# Patient Record
Sex: FEMALE | Race: OTHER | Employment: UNEMPLOYED | URBAN - METROPOLITAN AREA
[De-identification: names, ages, dates, MRNs, and addresses within clinical notes are randomized per-mention and may not be internally consistent; named-entity substitution may affect disease eponyms.]

---

## 2018-08-11 ENCOUNTER — HOSPITAL ENCOUNTER (EMERGENCY)
Age: 23
Discharge: HOME | End: 2018-08-11
Attending: EMERGENCY MEDICINE

## 2018-08-11 VITALS
HEIGHT: 67 IN | OXYGEN SATURATION: 97 % | BODY MASS INDEX: 39.24 KG/M2 | SYSTOLIC BLOOD PRESSURE: 140 MMHG | DIASTOLIC BLOOD PRESSURE: 90 MMHG | TEMPERATURE: 98.9 F | RESPIRATION RATE: 20 BRPM | HEART RATE: 70 BPM | WEIGHT: 250 LBS

## 2018-08-11 DIAGNOSIS — L02.419 AXILLARY ABSCESS: Primary | ICD-10-CM

## 2018-08-11 PROCEDURE — 99283 EMERGENCY DEPT VISIT LOW MDM: CPT

## 2018-08-11 RX ORDER — SULFAMETHOXAZOLE AND TRIMETHOPRIM 800; 160 MG/1; MG/1
1 TABLET ORAL 2 TIMES DAILY
Qty: 14 TABLET | Refills: 0 | Status: SHIPPED | OUTPATIENT
Start: 2018-08-11 | End: 2018-08-18

## 2018-08-11 RX ORDER — DOXYCYCLINE HYCLATE 100 MG
100 TABLET ORAL 2 TIMES DAILY
Qty: 14 TABLET | Refills: 0 | Status: SHIPPED | OUTPATIENT
Start: 2018-08-11 | End: 2018-08-18

## 2018-08-11 ASSESSMENT — PAIN DESCRIPTION - FREQUENCY: FREQUENCY: CONTINUOUS

## 2018-08-11 ASSESSMENT — PAIN DESCRIPTION - PAIN TYPE: TYPE: ACUTE PAIN

## 2018-08-11 ASSESSMENT — PAIN DESCRIPTION - ONSET: ONSET: ON-GOING

## 2018-08-11 ASSESSMENT — PAIN DESCRIPTION - ORIENTATION: ORIENTATION: LEFT

## 2018-08-11 ASSESSMENT — PAIN SCALES - GENERAL: PAINLEVEL_OUTOF10: 2

## 2018-08-11 ASSESSMENT — PAIN DESCRIPTION - LOCATION: LOCATION: ARM

## 2018-08-11 ASSESSMENT — PAIN DESCRIPTION - DESCRIPTORS: DESCRIPTORS: ACHING

## 2018-08-11 ASSESSMENT — PAIN DESCRIPTION - PROGRESSION: CLINICAL_PROGRESSION: GRADUALLY WORSENING

## 2018-08-12 ASSESSMENT — ENCOUNTER SYMPTOMS
ABDOMINAL PAIN: 0
VOMITING: 0
NAUSEA: 0
SHORTNESS OF BREATH: 0

## 2018-08-12 NOTE — ED PROVIDER NOTES
[facility] ED  Emergency Department Encounter  Emergency Medicine Resident     Pt Name: David Norman  MRN: [id number]  [place] 1995  Date of evaluation: 8/12/18  PCP:  No primary care provider on file. CHIEF COMPLAINT       Chief Complaint   Patient presents with    Abscess     left arm pit       HISTORY OF PRESENT ILLNESS  (Location/Symptom, Timing/Onset, Context/Setting, Quality, Duration, Modifying Factors, Severity.)      Carlie Silveira is a 25 y.o. female who presents with Complaints of left axillary lesion that is been ongoing for the past 2 months but is increased in size over the past one day. She says that she typically gets abscesses in the axilla, but they are usually small and resolve spontaneously. She says that she's never had any lesions last for a few days. She says the lesion is open and actively draining. She denies any fevers, nausea, or vomiting. She does admit to some associated pain on palpation. PAST MEDICAL / SURGICAL / SOCIAL / FAMILY HISTORY      has a past medical history of Anxiety; Headache(784.0); and Vision abnormalities. No past surgical history    Social History     Social History    Marital status: Single     Spouse name: N/A    Number of children: N/A    Years of education: N/A     Occupational History    Not on file.      Social History Main Topics    Smoking status: Current Some Day Smoker     Packs/day: 0.50     Types: Cigarettes     Last attempt to quit: 4/30/2015    Smokeless tobacco: Never Used    Alcohol use No    Drug use: No    Sexual activity: Not Currently     Other Topics Concern    Not on file     Social History Narrative    No narrative on file       Family History   Problem Relation Age of Onset    Depression Mother     Mental Illness Mother     High Blood Pressure Father     Heart Attack Father     Diabetes Maternal Grandmother     Mental Illness Maternal Grandmother        Allergies:  Azithromycin; mouth 2 times daily for 7 days     Dispense:  14 tablet     Refill:  0    doxycycline hyclate (VIBRA-TABS) 100 MG tablet     Sig: Take 1 tablet by mouth 2 times daily for 7 days     Dispense:  14 tablet     Refill:  0       DDX: Abscess, cellulitis, hidradenitis    DIAGNOSTIC RESULTS / EMERGENCY DEPARTMENT COURSE / MDM     LABS:  No results found for this visit on 08/11/18. RADIOLOGY:  None     EKG  None     All EKG's are interpreted by the Emergency Department Physician who either signs or Co-signs this chart in the absence of a cardiologist.    EMERGENCY DEPARTMENT COURSE:  Patient presented emergency department for evaluation of lesion to the left axilla. On initial evaluation, patient was afebrile. She was noted to have a 1 cm x 1 cm open lesion to the left axilla consistent with an open abscess. Plan for discharge home with Bactrim. We'll also give patient a prescription for doxycycline and she was concerned about the price. She was told to fill the cheaper of the 2 prescriptions and shred the other. She was given return precautions and all questions were answered. PROCEDURES:  None    Procedures    CONSULTS:  None    CRITICAL CARE:  None     FINAL IMPRESSION      1.  Axillary abscess          DISPOSITION / PLAN     DISPOSITION Decision To Discharge 08/11/2018 11:38:06 PM      PATIENT REFERRED TO:  [facility] ED  [address] [address]  [phone]  Go to   As needed      DISCHARGE MEDICATIONS:  Discharge Medication List as of 8/11/2018 11:39 PM      START taking these medications    Details   sulfamethoxazole-trimethoprim (BACTRIM DS) 800-160 MG per tablet Take 1 tablet by mouth 2 times daily for 7 days, Disp-14 tablet, R-0Print      doxycycline hyclate (VIBRA-TABS) 100 MG tablet Take 1 tablet by mouth 2 times daily for 7 days, Disp-14 tablet, R-0Print             Mana Monterroso MD  Emergency Medicine Resident    (Please note that portions of this note were completed

## 2018-08-12 NOTE — ED PROVIDER NOTES
[address] ED     Emergency Department     Faculty Attestation        I performed a history and physical examination of the patient and discussed management with the resident. I reviewed the residents note and agree with the documented findings and plan of care. Any areas of disagreement are noted on the chart. I was personally present for the key portions of any procedures. I have documented in the chart those procedures where I was not present during the key portions. I have reviewed the emergency nurses triage note. I agree with the chief complaint, past medical history, past surgical history, allergies, medications, social and family history as documented unless otherwise noted below. For Physician Assistant/ Nurse Practitioner cases/documentation I have personally evaluated this patient and have completed at least one if not all key elements of the E/M (history, physical exam, and MDM). Additional findings are as noted. Vital Signs: BP: (!) 140/90  Pulse: 70  Resp: 20  Temp: 98.9 °F (37.2 °C) SpO2: 97 %  PCP:  No primary care provider on file. Pertinent Comments:         Critical Care  None      (Please note that portions of this note were completed with a voice recognition program. Efforts were made to edit the dictations but occasionally words are mis-transcribed.  Whenever words are used in this note in any gender, they shall be construed as though they were used in the gender appropriate to the circumstances; and whenever words are used in this note in the singular or plural form, they shall be construed as though they were used in the form appropriate to the circumstances.)    MD Alverto Garcia  Attending Emergency Medicine Physician            Nik Sigala MD  08/11/18 0055

## 2019-07-04 ENCOUNTER — APPOINTMENT (OUTPATIENT)
Dept: GENERAL RADIOLOGY | Age: 24
End: 2019-07-04
Payer: COMMERCIAL

## 2019-07-04 ENCOUNTER — HOSPITAL ENCOUNTER (EMERGENCY)
Age: 24
Discharge: HOME | End: 2019-07-04
Attending: EMERGENCY MEDICINE
Payer: COMMERCIAL

## 2019-07-04 VITALS
BODY MASS INDEX: 43.95 KG/M2 | HEART RATE: 96 BPM | WEIGHT: 280 LBS | OXYGEN SATURATION: 98 % | DIASTOLIC BLOOD PRESSURE: 93 MMHG | SYSTOLIC BLOOD PRESSURE: 140 MMHG | HEIGHT: 67 IN | TEMPERATURE: 98.4 F | RESPIRATION RATE: 18 BRPM

## 2019-07-04 DIAGNOSIS — S63.501A SPRAIN OF RIGHT WRIST, INITIAL ENCOUNTER: Primary | ICD-10-CM

## 2019-07-04 PROCEDURE — 73110 X-RAY EXAM OF WRIST: CPT

## 2019-07-04 PROCEDURE — 99283 EMERGENCY DEPT VISIT LOW MDM: CPT

## 2019-07-04 RX ORDER — ARIPIPRAZOLE 2 MG/1
TABLET ORAL
COMMUNITY
Start: 2019-03-22

## 2019-07-04 RX ORDER — HYDROXYZINE HYDROCHLORIDE 25 MG/1
TABLET, FILM COATED ORAL
COMMUNITY
Start: 2019-03-22

## 2019-07-04 ASSESSMENT — PAIN DESCRIPTION - ORIENTATION: ORIENTATION: RIGHT

## 2019-07-04 ASSESSMENT — PAIN DESCRIPTION - PAIN TYPE: TYPE: ACUTE PAIN

## 2019-07-04 ASSESSMENT — PAIN SCALES - GENERAL: PAINLEVEL_OUTOF10: 5

## 2019-07-04 ASSESSMENT — PAIN DESCRIPTION - LOCATION: LOCATION: WRIST
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
## 2019-07-15 ENCOUNTER — HOSPITAL ENCOUNTER (OUTPATIENT)
Dept: OCCUPATIONAL THERAPY | Facility: CLINIC | Age: 24
Setting detail: THERAPIES SERIES
Discharge: HOME | End: 2019-07-15
Payer: COMMERCIAL

## 2019-07-15 PROCEDURE — 97165 OT EVAL LOW COMPLEX 30 MIN: CPT

## 2019-07-15 PROCEDURE — 97110 THERAPEUTIC EXERCISES: CPT
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
## 2019-07-18 ENCOUNTER — HOSPITAL ENCOUNTER (OUTPATIENT)
Dept: OCCUPATIONAL THERAPY | Facility: CLINIC | Age: 24
Setting detail: THERAPIES SERIES
Discharge: HOME | End: 2019-07-18
Payer: COMMERCIAL

## 2019-07-18 PROCEDURE — 97035 APP MDLTY 1+ULTRASOUND EA 15: CPT

## 2019-07-18 PROCEDURE — 97110 THERAPEUTIC EXERCISES: CPT

## 2019-07-25 ENCOUNTER — HOSPITAL ENCOUNTER (OUTPATIENT)
Dept: OCCUPATIONAL THERAPY | Facility: CLINIC | Age: 24
Setting detail: THERAPIES SERIES
Discharge: HOME | End: 2019-07-25
Payer: COMMERCIAL

## 2019-07-25 NOTE — DISCHARGE SUMMARY
[] [address] [facility] floor       [address], [place]         Phone: [phone]       Fax: [phone] [x] [address] [facility] at [address] [facility] , [address] [place] [address]  Phone: [phone]  Fax: [phone]       Occupational [address] Extremity  Discharge Note      Date: 2019      Patient: Ann-Marie Damon  : 1995  MRN: [id number]    Physician: Manuel Swenson CNP  Insurance: [employer]     Medical Diagnosis: Right wrist sprain S63.501A. Rehab Codes: Stiffness in wrist M25.63, fine motor skills loss R29.818, pain in right forearm M79.631, pain in right hand M79.641, pain in right finger(s) M79.644, muscle wasting of hand M62.54. Onset Date: 19    Next Dr. Hightower Beam: TBD  Total visits attended:  2  Cancels/No shows: 0/3  Date of initial visit: 07/15/19               Date of final visit: 19    Last available Daily Progress Note, 19:  Subjective:    Pain:  [x] Yes  [] No           Location: Right dorso-radial wrist       Pain Rating: (0-10 scale) 2-3/10  Pain altered Tx:  [x] No  [] Yes  Action: NA  Pt Comments: NA     Objective:  Modalities:   Modality Flow Sheet:  START STOP Tx Modality       Electrical Stim:      19   Ultrasound: 0.8 W/cm2 x 8 mins  Duty factor: __100%  __50%  __33% __20%  Head size: 2 cm  MHz: __1mHz  __3mHz  Location: Dorso-radial right wrist       Hot Pack:       Cold Pack:      Exercises:  Flow Sheet:  Exercise Reps/Time Weight/Level Comments   AROM - right forearm, wrist 10 reps   Pt educ provided: written, verbal, demo. Dexteroll - wrist flex/ext 1series  1/2 pound Added   Pron/supination with cones 2 sets of 10 reps    Added   Wrist maze 2 reps    Added   Ultrasound, see parameters above      Administered     Resistive  with hand exercisor 30 reps 20 pounds  Added          Comments/Assessment: Pain on arrival 2-3/10.  AROM and resistive ex's added to program with

## 2019-07-29 ENCOUNTER — APPOINTMENT (OUTPATIENT)
Dept: OCCUPATIONAL THERAPY | Facility: CLINIC | Age: 24
End: 2019-07-29
Payer: COMMERCIAL

## 2019-07-31 ENCOUNTER — APPOINTMENT (OUTPATIENT)
Dept: OCCUPATIONAL THERAPY | Facility: CLINIC | Age: 24
End: 2019-07-31
Payer: COMMERCIAL

## 2019-12-28 ENCOUNTER — HOSPITAL ENCOUNTER (EMERGENCY)
Age: 24
Discharge: HOME | End: 2019-12-28
Attending: EMERGENCY MEDICINE

## 2019-12-28 VITALS
SYSTOLIC BLOOD PRESSURE: 148 MMHG | OXYGEN SATURATION: 97 % | BODY MASS INDEX: 43.95 KG/M2 | RESPIRATION RATE: 14 BRPM | HEART RATE: 87 BPM | HEIGHT: 67 IN | DIASTOLIC BLOOD PRESSURE: 100 MMHG | TEMPERATURE: 98.1 F | WEIGHT: 280 LBS

## 2019-12-28 DIAGNOSIS — B96.89 BACTERIAL VAGINOSIS: ICD-10-CM

## 2019-12-28 DIAGNOSIS — N76.0 BACTERIAL VAGINOSIS: ICD-10-CM

## 2019-12-28 DIAGNOSIS — N93.8 DYSFUNCTIONAL UTERINE BLEEDING: Primary | ICD-10-CM

## 2019-12-28 LAB
-: ABNORMAL
ABO/RH: NORMAL
ABSOLUTE EOS #: 0.1 K/UL (ref 0–0.4)
ABSOLUTE IMMATURE GRANULOCYTE: ABNORMAL K/UL (ref 0–0.3)
ABSOLUTE LYMPH #: 1.5 K/UL (ref 1–4.8)
ABSOLUTE MONO #: 0.5 K/UL (ref 0.1–1.2)
AMORPHOUS: ABNORMAL
BACTERIA: ABNORMAL
BASOPHILS # BLD: 1 % (ref 0–2)
BASOPHILS ABSOLUTE: 0 K/UL (ref 0–0.2)
BILIRUBIN URINE: NEGATIVE
CASTS UA: ABNORMAL /LPF
COLOR: YELLOW
COMMENT UA: ABNORMAL
CRYSTALS, UA: ABNORMAL /HPF
DIFFERENTIAL TYPE: ABNORMAL
DIRECT EXAM: ABNORMAL
EOSINOPHILS RELATIVE PERCENT: 2 % (ref 1–4)
EPITHELIAL CELLS UA: ABNORMAL /HPF (ref 0–5)
GLUCOSE URINE: NEGATIVE
HCG QUALITATIVE: NEGATIVE
HCT VFR BLD CALC: 43.9 % (ref 36–46)
HEMOGLOBIN: 14.7 G/DL (ref 12–16)
IMMATURE GRANULOCYTES: ABNORMAL %
KETONES, URINE: NEGATIVE
LEUKOCYTE ESTERASE, URINE: NEGATIVE
LYMPHOCYTES # BLD: 21 % (ref 24–44)
Lab: ABNORMAL
MCH RBC QN AUTO: 31 PG (ref 26–34)
MCHC RBC AUTO-ENTMCNC: 33.4 G/DL (ref 31–37)
MCV RBC AUTO: 92.6 FL (ref 80–100)
MONOCYTES # BLD: 6 % (ref 2–11)
MUCUS: ABNORMAL
NITRITE, URINE: NEGATIVE
NRBC AUTOMATED: ABNORMAL PER 100 WBC
OTHER OBSERVATIONS UA: ABNORMAL
PDW BLD-RTO: 12.7 % (ref 12.5–15.4)
PH UA: 6 (ref 5–8)
PLATELET # BLD: 233 K/UL (ref 140–450)
PLATELET ESTIMATE: ABNORMAL
PMV BLD AUTO: 8 FL (ref 6–12)
PROTEIN UA: NEGATIVE
RBC # BLD: 4.74 M/UL (ref 4–5.2)
RBC # BLD: ABNORMAL 10*6/UL
RBC UA: ABNORMAL /HPF (ref 0–2)
RENAL EPITHELIAL, UA: ABNORMAL /HPF
SEG NEUTROPHILS: 70 % (ref 36–66)
SEGMENTED NEUTROPHILS ABSOLUTE COUNT: 5 K/UL (ref 1.8–7.7)
SPECIFIC GRAVITY UA: 1.02 (ref 1–1.03)
SPECIMEN DESCRIPTION: ABNORMAL
TRICHOMONAS: ABNORMAL
TURBIDITY: ABNORMAL
URINE HGB: ABNORMAL
UROBILINOGEN, URINE: NORMAL
WBC # BLD: 7.1 K/UL (ref 3.5–11)
WBC # BLD: ABNORMAL 10*3/UL
WBC UA: ABNORMAL /HPF (ref 0–5)
YEAST: ABNORMAL

## 2019-12-28 PROCEDURE — 85025 COMPLETE CBC W/AUTO DIFF WBC: CPT

## 2019-12-28 PROCEDURE — 87510 GARDNER VAG DNA DIR PROBE: CPT

## 2019-12-28 PROCEDURE — 86900 BLOOD TYPING SEROLOGIC ABO: CPT

## 2019-12-28 PROCEDURE — 36415 COLL VENOUS BLD VENIPUNCTURE: CPT

## 2019-12-28 PROCEDURE — 87660 TRICHOMONAS VAGIN DIR PROBE: CPT

## 2019-12-28 PROCEDURE — 99283 EMERGENCY DEPT VISIT LOW MDM: CPT

## 2019-12-28 PROCEDURE — 84703 CHORIONIC GONADOTROPIN ASSAY: CPT

## 2019-12-28 PROCEDURE — 87591 N.GONORRHOEAE DNA AMP PROB: CPT

## 2019-12-28 PROCEDURE — 87491 CHLMYD TRACH DNA AMP PROBE: CPT

## 2019-12-28 PROCEDURE — 86901 BLOOD TYPING SEROLOGIC RH(D): CPT

## 2019-12-28 PROCEDURE — 81001 URINALYSIS AUTO W/SCOPE: CPT

## 2019-12-28 PROCEDURE — 87480 CANDIDA DNA DIR PROBE: CPT

## 2019-12-28 RX ORDER — METRONIDAZOLE 500 MG/1
500 TABLET ORAL 2 TIMES DAILY
Qty: 20 TABLET | Refills: 0 | Status: SHIPPED | OUTPATIENT
Start: 2019-12-28

## 2019-12-28 ASSESSMENT — PAIN DESCRIPTION - PAIN TYPE: TYPE: ACUTE PAIN

## 2019-12-28 ASSESSMENT — PAIN DESCRIPTION - LOCATION: LOCATION: ABDOMEN

## 2019-12-28 ASSESSMENT — PAIN SCALES - GENERAL: PAINLEVEL_OUTOF10: 7

## 2019-12-30 LAB
C TRACH DNA GENITAL QL NAA+PROBE: NEGATIVE
N. GONORRHOEAE DNA: NEGATIVE
SPECIMEN DESCRIPTION: NORMAL

## 2022-04-20 ENCOUNTER — HOSPITAL ENCOUNTER (EMERGENCY)
Age: 27
Discharge: HOME | End: 2022-04-20
Attending: EMERGENCY MEDICINE
Payer: COMMERCIAL

## 2022-04-20 VITALS
WEIGHT: 280 LBS | BODY MASS INDEX: 43.95 KG/M2 | HEART RATE: 79 BPM | RESPIRATION RATE: 20 BRPM | OXYGEN SATURATION: 98 % | SYSTOLIC BLOOD PRESSURE: 140 MMHG | DIASTOLIC BLOOD PRESSURE: 97 MMHG | HEIGHT: 67 IN | TEMPERATURE: 98.5 F

## 2022-04-20 DIAGNOSIS — N30.00 ACUTE CYSTITIS WITHOUT HEMATURIA: Primary | ICD-10-CM

## 2022-04-20 DIAGNOSIS — R10.2 VAGINAL PAIN: ICD-10-CM

## 2022-04-20 LAB
-: ABNORMAL
BACTERIA: ABNORMAL
BILIRUBIN URINE: NEGATIVE
COLOR: YELLOW
EPITHELIAL CELLS UA: ABNORMAL /HPF
GLUCOSE URINE: NEGATIVE
HCG(URINE) PREGNANCY TEST: NEGATIVE
KETONES, URINE: NEGATIVE
LEUKOCYTE ESTERASE, URINE: ABNORMAL
NITRITE, URINE: NEGATIVE
PH UA: 5 (ref 5–8)
PROTEIN UA: ABNORMAL
RBC UA: ABNORMAL /HPF
SPECIFIC GRAVITY UA: 1.03 (ref 1–1.03)
TURBIDITY: ABNORMAL
URINE HGB: NEGATIVE
UROBILINOGEN, URINE: NORMAL
WBC UA: ABNORMAL /HPF

## 2022-04-20 PROCEDURE — 87086 URINE CULTURE/COLONY COUNT: CPT

## 2022-04-20 PROCEDURE — 99283 EMERGENCY DEPT VISIT LOW MDM: CPT

## 2022-04-20 PROCEDURE — 81001 URINALYSIS AUTO W/SCOPE: CPT

## 2022-04-20 PROCEDURE — 81025 URINE PREGNANCY TEST: CPT

## 2022-04-20 RX ORDER — SULFAMETHOXAZOLE AND TRIMETHOPRIM 800; 160 MG/1; MG/1
1 TABLET ORAL 2 TIMES DAILY
Qty: 14 TABLET | Refills: 0 | Status: SHIPPED | OUTPATIENT
Start: 2022-04-20 | End: 2022-04-27

## 2022-04-20 ASSESSMENT — PAIN - FUNCTIONAL ASSESSMENT: PAIN_FUNCTIONAL_ASSESSMENT: 0-10

## 2022-04-20 ASSESSMENT — PAIN SCALES - GENERAL: PAINLEVEL_OUTOF10: 4

## 2022-04-20 NOTE — ED PROVIDER NOTES
[address] ED  eMERGENCY dEPARTMENT eNCOUnter      Pt Name: Roger Ponce  MRN: [id number]  [place] 1995  Date of evaluation: 4/20/22      CHIEF COMPLAINT:  Chief Complaint   Patient presents with    Abscess     labial abscess       HISTORY OF PRESENT ILLNESS    Carlie Barajas is a 32 y.o. female who presents to the emergency room complaining of abscess to vaginal area. States that they noticed this area painful, red and swollen this morning. Denies drainage. No fever, chills, nausea, emesis. Does get cysts often in groin and axillas. No other complaints. Nursing Notes were reviewed. REVIEW OF SYSTEMS       Constitutional:  Per HPI  Eyes: No visual changes. Neck: No neck pain. Respiratory: Denies recent shortness of breath. Cardiac:  Denies recent chest pain. GI:  Per HPI  Musculoskeletal: Denies focal weakness. Neurologic: Denies headache or focal weakness. Skin:  rash    Negative in 10 essential Systems except as mentioned above and in the HPI. PAST MEDICAL HISTORY   PMH:  has a past medical history of Anxiety, Depression, Headache(784.0), Morbid obesity ([address] PCOS (polycystic ovarian syndrome), PTSD (post-traumatic stress disorder), and Vision abnormalities. Surgical History:  has no past surgical history on file. Social History:  reports that she quit smoking today. Her smoking use included cigarettes. She has a 3.50 pack-year smoking history. She has never used smokeless tobacco. She reports that she does not drink alcohol and does not use drugs. Family History: None  Psychiatric History: None    Allergies:is allergic to azithromycin, mycinettes, penicillins, and naproxen. PHYSICAL EXAM     INITIAL VITALS: BP (!) 140/97   Pulse 79   Temp 98.5 °F (36.9 °C) (Oral)   Resp 20   Ht 5' 6.5\" (1.689 m)   Wt 280 lb (127 kg)   LMP 02/17/2022   SpO2 98%   BMI 44.52 kg/m²   Physical Exam  Vitals reviewed. Exam conducted with a chaperone present. Constitutional:       Appearance: Normal appearance. She is normal weight. Cardiovascular:      Rate and Rhythm: Normal rate and regular rhythm. Pulses: Normal pulses. Heart sounds: Normal heart sounds. Pulmonary:      Effort: Pulmonary effort is normal.      Breath sounds: Normal breath sounds. Abdominal:      General: Abdomen is flat. Tenderness: There is no abdominal tenderness. There is no guarding or rebound. Hernia: There is no hernia in the left inguinal area or right inguinal area. Genitourinary:     Exam position: Supine. Labia:         Right: No rash, tenderness, lesion or injury. Left: No rash, tenderness, lesion or injury. Lymphadenopathy:      Lower Body: No right inguinal adenopathy. No left inguinal adenopathy. Skin:     General: Skin is warm. Capillary Refill: Capillary refill takes less than 2 seconds. Neurological:      General: No focal deficit present. Mental Status: She is alert and oriented to person, place, and time. Mental status is at baseline. DIAGNOSTIC RESULTS     EKG: All EKG's are interpreted by the Emergency Department Physician who either signs or Co-signs this chart in the absence of a cardiologist.  Not indicated    RADIOLOGY:   Reviewed the radiologist:  No orders to display     Not indicated      LABS:  Labs Reviewed   URINALYSIS WITH REFLEX TO CULTURE - Abnormal; Notable for the following components:       Result Value    Turbidity UA Turbid (*)     Protein, UA TRACE (*)     Leukocyte Esterase, Urine LARGE (*)     All other components within normal limits   MICROSCOPIC URINALYSIS - Abnormal; Notable for the following components:    Bacteria, UA FEW (*)     All other components within normal limits   CULTURE, URINE   PREGNANCY, URINE         EMERGENCY DEPARTMENT COURSE:   -------------------------  Pt reports painful lump next to clitoris. Noticed it today. On exam, there is no palpable mass.   No induration, fluctuance or erythema. No bartholins cyst.  Clitoris is WNL. Pt is slightly tender lateral to clitoris. UA shows large leukocytes. Will treat UTI. Referred to GYN. Strict return precautions discussed at bedside. She is agreeable. Discussed results and plan with the pt. They expressed appropriate understanding. Pt given close follow up, supportive care instructions and strict return instructions at the bedside. Patient was given oral antibiotics for bacterial coverage and both verbal and written instructions to follow up to ED or Family Doctor in two days for recheck and/or packing change. Was instructed to return for any worsening of the abscess or surrounding cellulitis. The care is provided during an unprecedented national emergency due to the novel coronavirus, COVID-19. Orders Placed This Encounter   Medications    sulfamethoxazole-trimethoprim (BACTRIM DS) 800-160 MG per tablet     Sig: Take 1 tablet by mouth 2 times daily for 7 days     Dispense:  14 tablet     Refill:  0       CONSULTS:  None      FINAL IMPRESSION      1. Acute cystitis without hematuria    2.  Vaginal pain                DISPOSITION/PLAN:  DISPOSITION Decision To Discharge 04/20/2022 01:47:51 PM        PATIENT REFERRED TO:  [facility] SPECIALIZED SURGERY  [facility]  [address] [facility] [address]  [phone]  Call       [facility] ED  [facility] [address]  [phone]        [facility], [address] [place] [address] 75 [address]  [address] [address]  [phone]    Call         DISCHARGE MEDICATIONS:  Discharge Medication List as of 4/20/2022  1:52 PM      START taking these medications    Details   sulfamethoxazole-trimethoprim (BACTRIM DS) 800-160 MG per tablet Take 1 tablet by mouth 2 times daily for 7 days, Disp-14 tablet, R-0Normal             (Please note that portions of this note were completed with a voice recognition program.  Efforts were made to edit the dictations but occasionally words are mis-transcribed.)    ARCHIE Mccullough PA-C  04/20/22 [address] [facility] Maria Isabel Davis PA-C  04/20/22 1412

## 2022-04-20 NOTE — ED NOTES
Pt states she noted a hardened lump in her perineal area. No drainage noted. Pt has not had nausea, vomiting or a fever per pt.       Keely Elder RN  04/20/22 3408

## 2022-04-20 NOTE — ED PROVIDER NOTES
eMERGENCY dEPARTMENT eNCOUnter   Independent Attestation     Pt Name: Virginie Chamberlain  MRN: [id number]  [place] 1995  Date of evaluation: 4/20/22     Carlie Carbajal is a 32 y.o. female with CC: Abscess (labial abscess)      Based on the medical record the care appears appropriate. I was personally available for consultation in the Emergency Department. The care is provided during an unprecedented national emergency due to the novel coronavirus, COVID 19.     Millicent Dang DO  Attending Emergency Physician                  Millicent Dang DO  04/20/22 8637

## 2022-04-21 LAB
CULTURE: NORMAL
SPECIMEN DESCRIPTION: NORMAL

## 2022-05-12 ENCOUNTER — HOSPITAL ENCOUNTER (EMERGENCY)
Age: 27
Discharge: HOME | End: 2022-05-12
Attending: EMERGENCY MEDICINE
Payer: COMMERCIAL

## 2022-05-12 VITALS
RESPIRATION RATE: 16 BRPM | HEIGHT: 66 IN | TEMPERATURE: 97 F | BODY MASS INDEX: 45 KG/M2 | DIASTOLIC BLOOD PRESSURE: 82 MMHG | SYSTOLIC BLOOD PRESSURE: 142 MMHG | OXYGEN SATURATION: 96 % | WEIGHT: 280 LBS | HEART RATE: 89 BPM

## 2022-05-12 DIAGNOSIS — M79.89 LEFT LEG SWELLING: Primary | ICD-10-CM

## 2022-05-12 LAB — D-DIMER QUANTITATIVE: 0.48 MG/L FEU (ref 0–0.59)

## 2022-05-12 PROCEDURE — 99283 EMERGENCY DEPT VISIT LOW MDM: CPT

## 2022-05-12 PROCEDURE — 36415 COLL VENOUS BLD VENIPUNCTURE: CPT

## 2022-05-12 PROCEDURE — 85379 FIBRIN DEGRADATION QUANT: CPT

## 2022-05-12 ASSESSMENT — ENCOUNTER SYMPTOMS
BACK PAIN: 0
COLOR CHANGE: 0
ABDOMINAL PAIN: 0
SHORTNESS OF BREATH: 0
EYE PAIN: 0

## 2022-05-12 NOTE — ED TRIAGE NOTES
Mode of arrival (squad #, walk in, police, etc) : walk in        Chief complaint(s): leg swelling        Arrival Note (brief scenario, treatment PTA, etc). : Pt with leg swelling for a week and a half, unknown reason. Pt was sent from urgent care for ultrasound. C= \"Have you ever felt that you should Cut down on your drinking? \"  No  A= \"Have people Annoyed you by criticizing your drinking? \"  No  G= \"Have you ever felt bad or Guilty about your drinking? \"  No  E= \"Have you ever had a drink as an Eye-opener first thing in the morning to steady your nerves or to help a hangover? \"  No      Deferred []      Reason for deferring: N/A    *If yes to two or more: probable alcohol abuse. *

## 2022-05-13 NOTE — ED PROVIDER NOTES
EMERGENCY DEPARTMENT ENCOUNTER    Pt Name: Monae Diaz  MRN: [id number]  [place] 1995  Date of evaluation: 5/12/22  CHIEF COMPLAINT       Chief Complaint   Patient presents with    Leg Swelling     HISTORY OF PRESENT ILLNESS   80-year-old female presents for left foot ankle swelling. Patient reports symptoms for about the last week. Patient denies any known injuries, denies any significant redness that she is noted, states that it is feeling a little sore but denies any significant pain, denies any calf pain or tenderness, denies any associated chest pain or shortness of breath. Patient denies any history of prior blood clots, denies any history of malignancy recent casting or bedrest.  Patient is on birth control and does vape. The history is provided by the patient. REVIEW OF SYSTEMS     Review of Systems   Constitutional: Negative for fever. HENT: Negative for congestion and ear pain. Eyes: Negative for pain. Respiratory: Negative for shortness of breath. Cardiovascular: Negative for chest pain, palpitations and leg swelling. Gastrointestinal: Negative for abdominal pain. Genitourinary: Negative for dysuria and flank pain. Musculoskeletal: Negative for back pain. Leg pain-swelling   Skin: Negative for color change. Neurological: Negative for numbness and headaches. Psychiatric/Behavioral: Negative for confusion. All other systems reviewed and are negative. PASTMEDICAL HISTORY     Past Medical History:   Diagnosis Date    Anxiety     Depression     Headache(784.0)     constant headaches    Morbid obesity ([address]     PCOS (polycystic ovarian syndrome)     PTSD (post-traumatic stress disorder)     Vision abnormalities     wears glasses     Past Problem List  Patient Active Problem List   Diagnosis Code    Cervical radicular pain M54.12    Gastritis K29.70     SURGICAL HISTORY     History reviewed. No pertinent surgical history.   CURRENT MEDICATIONS Discharge Medication List as of 2022 10:10 PM      CONTINUE these medications which have NOT CHANGED    Details   metroNIDAZOLE (FLAGYL) 500 MG tablet Take 1 tablet by mouth 2 times daily, Disp-20 tablet, R-0Print      ARIPiprazole (ABILIFY) 2 MG tablet Historical Med      hydrOXYzine (ATARAX) 25 MG tablet Historical Med           ALLERGIES     is allergic to azithromycin, mycinettes, penicillins, and naproxen. FAMILY HISTORY     She indicated that her mother is alive. She indicated that her father is alive. She indicated that the status of her maternal grandmother is unknown. SOCIAL HISTORY       Social History     Tobacco Use    Smoking status: Former Smoker     Packs/day: 0.50     Years: 7.00     Pack years: 3.50     Types: Cigarettes     Quit date: 2022     Years since quittin.0    Smokeless tobacco: Never Used   Vaping Use    Vaping Use: Every day   Substance Use Topics    Alcohol use: No     Alcohol/week: 0.0 standard drinks    Drug use: No     PHYSICAL EXAM     INITIAL VITALS: BP (!) 142/82   Pulse 89   Temp 97 °F (36.1 °C) (Temporal)   Resp 16   Ht 5' 6\" (1.676 m)   Wt 280 lb (127 kg)   LMP 2022   SpO2 96%   BMI 45.19 kg/m²    Physical Exam  Vitals and nursing note reviewed. Constitutional:       General: She is not in acute distress. Appearance: Normal appearance. She is not toxic-appearing. HENT:      Head: Normocephalic and atraumatic. Nose: Nose normal.      Mouth/Throat:      Mouth: Mucous membranes are moist.      Pharynx: Oropharynx is clear. Eyes:      Extraocular Movements: Extraocular movements intact. Conjunctiva/sclera: Conjunctivae normal.      Pupils: Pupils are equal, round, and reactive to light. Cardiovascular:      Rate and Rhythm: Normal rate and regular rhythm. Pulses: Normal pulses. Heart sounds: Normal heart sounds. Pulmonary:      Effort: Pulmonary effort is normal.      Breath sounds: Normal breath sounds. Abdominal:      General: Bowel sounds are normal. There is no distension. Palpations: Abdomen is soft. Tenderness: There is no abdominal tenderness. Musculoskeletal:         General: Normal range of motion. Cervical back: Normal range of motion. Comments: Minimal swelling noted to the left ankle, +2 DP pulses, sensation is intact, no calf tenderness, no evidence of Achilles tendon defect, sensation is intact   Skin:     General: Skin is warm and dry. Capillary Refill: Capillary refill takes less than 2 seconds. Neurological:      General: No focal deficit present. Mental Status: She is alert and oriented to person, place, and time. Cranial Nerves: Cranial nerves are intact. Sensory: Sensation is intact. Motor: Motor function is intact. Psychiatric:         Mood and Affect: Mood normal.         Thought Content: Thought content does not include homicidal or suicidal ideation. MEDICAL DECISION MAKIN-year-old female presents for left ankle and foot swelling.   On initial exam patient in no acute distress vitals are stable, no significant tenderness elicited on exam, is noted have a minimal amount of swelling to the left ankle no significant pitting edema, no significant erythema, patient did have x-ray imaging at urgent care which she reports was negative    Wells DVT Criteria:  []YES  [x]NO :History of previous DVT (If yes 1 point)  []YES  [x]NO :Active cancer treatment ongoing or within the previous six months or palliative (If yes 1 point)  []YES  [x]NO :Paralysis, paresis, or recent plaster immobilization of the lower extremities  (If yes 1 point)  []YES  [x]NO :Recently bedridden for more than three days, or major surgery within twelve weeks (If yes 1 point)  []YES  [x]NO :Localized tenderness along the distribution of the deep venous system (If yes 1 point)  []YES  [x]NO :Entire leg swollen (If yes 1 point)  []YES  [x]NO :Calf swelling by more than 3 cm when compared to the asymptomatic leg measured 10 cm below tibial tuberosity (If yes 1 point)  []YES  [x]NO :Pitting edema in the symptomatic leg (If yes 1 point)  []YES  [x]NO :Collateral superficial veins (nonvaricose) (If yes 1 point)  []YES  [x]NO :Alternative diagnosis as likely or more likely than that of deep venous thrombosis (-2 if yes)    Wells DVT Score Criteria Below TOTAL =  0  If Wells score is 2 or less, then d-dimer testing is recommended. Wells score is 0, will check D-dimer    D-dimer at 0.48    Given that D-dimer not significantly elevated, patient with a Wells score of 0, low risk for DVT at this time    Discussed results with patient, discussed that she is low risk for DVT at this time, discussed continued monitoring of her symptoms, discussed need for follow-up with PCP and return precautions, patient voiced understanding and is comfortable with plan and discharge home    Patient/Guardian was informed of their diagnosis and told to follow up with PCP in 1-3 days. Patient demonstrates understanding and agreement with the plan. They were given the opportunity to ask questions and those questions were answered to the best of our ability with the available information. Patient/Guardian told to return to the ED for any new, worsening, changing or persistent symptoms. This dictation was prepared using [employer] voice recognition software. CRITICAL CARE:       PROCEDURES:    Procedures    DIAGNOSTIC RESULTS   EKG:All EKG's are interpreted by the Emergency Department Physician who either signs or Co-signs this chart in the absence of a cardiologist.        RADIOLOGY:All plain film, CT, MRI, and formal ultrasound images (except ED bedside ultrasound) are read by the radiologist, see reports below, unless otherwisenoted in MDM or here. No orders to display     LABS: All lab results were reviewed by myself, and all abnormals are listed below.   Labs Reviewed   D-DIMER, QUANTITATIVE       EMERGENCY DEPARTMENTCOURSE:         Vitals:    Vitals:    05/12/22 1950   BP: (!) 142/82   Pulse: 89   Resp: 16   Temp: 97 °F (36.1 °C)   TempSrc: Temporal   SpO2: 96%   Weight: 280 lb (127 kg)   Height: 5' 6\" (1.676 m)       The patient was given the following medications while in the emergency department:  No orders of the defined types were placed in this encounter. CONSULTS:  None    FINAL IMPRESSION      1. Left leg swelling          DISPOSITION/PLAN   DISPOSITION Decision To Discharge 05/12/2022 10:09:27 PM      PATIENT REFERRED TO:  [facility]  [address] [address]  [phone]    As needed, If symptoms worsen    CARLOS PARR[address] [address]  [phone]  Schedule an appointment as soon as possible for a visit       DISCHARGE MEDICATIONS:  Discharge Medication List as of 5/12/2022 10:10 PM        The care is provided during an unprecedented national emergency due to the novel coronavirus, COVID 19.   [address], DO                    [address], DO  05/13/22 3243

## 2022-07-17 ENCOUNTER — APPOINTMENT (OUTPATIENT)
Dept: GENERAL RADIOLOGY | Age: 27
End: 2022-07-17
Payer: COMMERCIAL

## 2022-07-17 ENCOUNTER — HOSPITAL ENCOUNTER (EMERGENCY)
Age: 27
Discharge: HOME | End: 2022-07-17
Attending: STUDENT IN AN ORGANIZED HEALTH CARE EDUCATION/TRAINING PROGRAM
Payer: COMMERCIAL

## 2022-07-17 VITALS
RESPIRATION RATE: 16 BRPM | OXYGEN SATURATION: 94 % | HEIGHT: 66 IN | DIASTOLIC BLOOD PRESSURE: 66 MMHG | SYSTOLIC BLOOD PRESSURE: 126 MMHG | WEIGHT: 282 LBS | BODY MASS INDEX: 45.32 KG/M2 | TEMPERATURE: 100 F | HEART RATE: 115 BPM

## 2022-07-17 DIAGNOSIS — N30.00 ACUTE CYSTITIS WITHOUT HEMATURIA: ICD-10-CM

## 2022-07-17 DIAGNOSIS — U07.1 COVID-19: Primary | ICD-10-CM

## 2022-07-17 LAB
ABSOLUTE EOS #: 0 K/UL (ref 0–0.4)
ABSOLUTE LYMPH #: 0.27 K/UL (ref 1–4.8)
ABSOLUTE MONO #: 0.11 K/UL (ref 0.1–1.3)
ALBUMIN SERPL-MCNC: 4.4 G/DL (ref 3.5–5.2)
ALP BLD-CCNC: 80 U/L (ref 35–104)
ALT SERPL-CCNC: 34 U/L (ref 5–33)
ANION GAP SERPL CALCULATED.3IONS-SCNC: 12 MMOL/L (ref 9–17)
AST SERPL-CCNC: 29 U/L
BACTERIA: ABNORMAL
BASOPHILS # BLD: 0 % (ref 0–2)
BASOPHILS ABSOLUTE: 0 K/UL (ref 0–0.2)
BILIRUB SERPL-MCNC: 0.31 MG/DL (ref 0.3–1.2)
BILIRUBIN URINE: NEGATIVE
BUN BLDV-MCNC: 8 MG/DL (ref 6–20)
CALCIUM SERPL-MCNC: 9.1 MG/DL (ref 8.6–10.4)
CASTS UA: ABNORMAL /LPF
CHLORIDE BLD-SCNC: 103 MMOL/L (ref 98–107)
CO2: 22 MMOL/L (ref 20–31)
COLOR: YELLOW
CREAT SERPL-MCNC: 0.7 MG/DL (ref 0.5–0.9)
EOSINOPHILS RELATIVE PERCENT: 0 % (ref 0–4)
EPITHELIAL CELLS UA: ABNORMAL /HPF
GFR AFRICAN AMERICAN: >60 ML/MIN
GFR NON-AFRICAN AMERICAN: >60 ML/MIN
GFR SERPL CREATININE-BSD FRML MDRD: ABNORMAL ML/MIN/{1.73_M2}
GLUCOSE BLD-MCNC: 116 MG/DL (ref 70–99)
GLUCOSE URINE: NEGATIVE
HCG(URINE) PREGNANCY TEST: NEGATIVE
HCT VFR BLD CALC: 40.2 % (ref 36–46)
HEMOGLOBIN: 13.7 G/DL (ref 12–16)
INFLUENZA A: NOT DETECTED
INFLUENZA B: NOT DETECTED
KETONES, URINE: NEGATIVE
LACTIC ACID: 1.5 MMOL/L (ref 0.5–2.2)
LEUKOCYTE ESTERASE, URINE: ABNORMAL
LIPASE: 26 U/L (ref 13–60)
LYMPHOCYTES # BLD: 5 % (ref 24–44)
MAGNESIUM: 2 MG/DL (ref 1.6–2.6)
MCH RBC QN AUTO: 30.8 PG (ref 26–34)
MCHC RBC AUTO-ENTMCNC: 34 G/DL (ref 31–37)
MCV RBC AUTO: 90.5 FL (ref 80–100)
MONOCYTES # BLD: 2 % (ref 1–7)
MORPHOLOGY: NORMAL
NITRITE, URINE: NEGATIVE
PDW BLD-RTO: 13 % (ref 11.5–14.9)
PH UA: 7.5 (ref 5–8)
PLATELET # BLD: 216 K/UL (ref 150–450)
PMV BLD AUTO: 7.9 FL (ref 6–12)
POTASSIUM SERPL-SCNC: 4.4 MMOL/L (ref 3.7–5.3)
PROTEIN UA: NEGATIVE
RBC # BLD: 4.44 M/UL (ref 4–5.2)
RBC UA: ABNORMAL /HPF
SARS-COV-2 RNA, RT PCR: DETECTED
SEG NEUTROPHILS: 93 % (ref 36–66)
SEGMENTED NEUTROPHILS ABSOLUTE COUNT: 5.02 K/UL (ref 1.3–9.1)
SODIUM BLD-SCNC: 137 MMOL/L (ref 135–144)
SOURCE: ABNORMAL
SPECIFIC GRAVITY UA: 1.02 (ref 1–1.03)
SPECIMEN DESCRIPTION: ABNORMAL
TOTAL PROTEIN: 6.8 G/DL (ref 6.4–8.3)
TURBIDITY: CLEAR
URINE HGB: NEGATIVE
UROBILINOGEN, URINE: NORMAL
WBC # BLD: 5.4 K/UL (ref 3.5–11)
WBC UA: ABNORMAL /HPF

## 2022-07-17 PROCEDURE — 85025 COMPLETE CBC W/AUTO DIFF WBC: CPT

## 2022-07-17 PROCEDURE — 83735 ASSAY OF MAGNESIUM: CPT

## 2022-07-17 PROCEDURE — 83605 ASSAY OF LACTIC ACID: CPT

## 2022-07-17 PROCEDURE — 96375 TX/PRO/DX INJ NEW DRUG ADDON: CPT

## 2022-07-17 PROCEDURE — 96374 THER/PROPH/DIAG INJ IV PUSH: CPT

## 2022-07-17 PROCEDURE — 87636 SARSCOV2 & INF A&B AMP PRB: CPT

## 2022-07-17 PROCEDURE — 2580000003 HC RX 258: Performed by: STUDENT IN AN ORGANIZED HEALTH CARE EDUCATION/TRAINING PROGRAM

## 2022-07-17 PROCEDURE — 80053 COMPREHEN METABOLIC PANEL: CPT

## 2022-07-17 PROCEDURE — 81025 URINE PREGNANCY TEST: CPT

## 2022-07-17 PROCEDURE — 36415 COLL VENOUS BLD VENIPUNCTURE: CPT

## 2022-07-17 PROCEDURE — 6370000000 HC RX 637 (ALT 250 FOR IP): Performed by: STUDENT IN AN ORGANIZED HEALTH CARE EDUCATION/TRAINING PROGRAM

## 2022-07-17 PROCEDURE — 99284 EMERGENCY DEPT VISIT MOD MDM: CPT

## 2022-07-17 PROCEDURE — 6360000002 HC RX W HCPCS: Performed by: STUDENT IN AN ORGANIZED HEALTH CARE EDUCATION/TRAINING PROGRAM

## 2022-07-17 PROCEDURE — 81001 URINALYSIS AUTO W/SCOPE: CPT

## 2022-07-17 PROCEDURE — 71045 X-RAY EXAM CHEST 1 VIEW: CPT

## 2022-07-17 PROCEDURE — 83690 ASSAY OF LIPASE: CPT

## 2022-07-17 RX ORDER — 0.9 % SODIUM CHLORIDE 0.9 %
1000 INTRAVENOUS SOLUTION INTRAVENOUS ONCE
Status: COMPLETED | OUTPATIENT
Start: 2022-07-17 | End: 2022-07-17

## 2022-07-17 RX ORDER — NITROFURANTOIN 25; 75 MG/1; MG/1
100 CAPSULE ORAL 2 TIMES DAILY
Qty: 10 CAPSULE | Refills: 0 | Status: SHIPPED | OUTPATIENT
Start: 2022-07-17 | End: 2022-07-22

## 2022-07-17 RX ORDER — ACETAMINOPHEN 500 MG
1000 TABLET ORAL ONCE
Status: COMPLETED | OUTPATIENT
Start: 2022-07-17 | End: 2022-07-17

## 2022-07-17 RX ORDER — NITROFURANTOIN 25; 75 MG/1; MG/1
100 CAPSULE ORAL ONCE
Status: COMPLETED | OUTPATIENT
Start: 2022-07-17 | End: 2022-07-17

## 2022-07-17 RX ORDER — KETOROLAC TROMETHAMINE 30 MG/ML
15 INJECTION, SOLUTION INTRAMUSCULAR; INTRAVENOUS ONCE
Status: COMPLETED | OUTPATIENT
Start: 2022-07-17 | End: 2022-07-17

## 2022-07-17 RX ORDER — DIPHENHYDRAMINE HYDROCHLORIDE 50 MG/ML
25 INJECTION INTRAMUSCULAR; INTRAVENOUS ONCE
Status: COMPLETED | OUTPATIENT
Start: 2022-07-17 | End: 2022-07-17

## 2022-07-17 RX ORDER — METOCLOPRAMIDE HYDROCHLORIDE 5 MG/ML
10 INJECTION INTRAMUSCULAR; INTRAVENOUS ONCE
Status: COMPLETED | OUTPATIENT
Start: 2022-07-17 | End: 2022-07-17

## 2022-07-17 RX ORDER — IBUPROFEN 600 MG/1
600 TABLET ORAL ONCE
Status: COMPLETED | OUTPATIENT
Start: 2022-07-17 | End: 2022-07-17

## 2022-07-17 RX ADMIN — DIPHENHYDRAMINE HYDROCHLORIDE 25 MG: 50 INJECTION, SOLUTION INTRAMUSCULAR; INTRAVENOUS at 05:07

## 2022-07-17 RX ADMIN — KETOROLAC TROMETHAMINE 15 MG: 30 INJECTION, SOLUTION INTRAMUSCULAR; INTRAVENOUS at 05:08

## 2022-07-17 RX ADMIN — SODIUM CHLORIDE 1000 ML: 9 INJECTION, SOLUTION INTRAVENOUS at 05:25

## 2022-07-17 RX ADMIN — SODIUM CHLORIDE 1000 ML: 9 INJECTION, SOLUTION INTRAVENOUS at 04:25

## 2022-07-17 RX ADMIN — ACETAMINOPHEN 1000 MG: 500 TABLET ORAL at 04:23

## 2022-07-17 RX ADMIN — NITROFURANTOIN MONOHYDRATE/MACROCRYSTALLINE 100 MG: 25; 75 CAPSULE ORAL at 06:26

## 2022-07-17 RX ADMIN — METOCLOPRAMIDE HYDROCHLORIDE 10 MG: 5 INJECTION INTRAMUSCULAR; INTRAVENOUS at 05:10

## 2022-07-17 RX ADMIN — IBUPROFEN 600 MG: 600 TABLET, FILM COATED ORAL at 05:23

## 2022-07-17 ASSESSMENT — ENCOUNTER SYMPTOMS
DIARRHEA: 0
NAUSEA: 0
VOMITING: 0
SHORTNESS OF BREATH: 0
ABDOMINAL PAIN: 0
EYE DISCHARGE: 0
SORE THROAT: 0
RHINORRHEA: 0
EYE REDNESS: 0

## 2022-07-17 ASSESSMENT — PAIN SCALES - GENERAL
PAINLEVEL_OUTOF10: 8
PAINLEVEL_OUTOF10: 1
PAINLEVEL_OUTOF10: 8

## 2022-07-17 ASSESSMENT — PAIN DESCRIPTION - LOCATION
LOCATION: HEAD

## 2022-07-17 ASSESSMENT — PAIN DESCRIPTION - FREQUENCY
FREQUENCY: INTERMITTENT
FREQUENCY: CONTINUOUS

## 2022-07-17 ASSESSMENT — PAIN DESCRIPTION - DESCRIPTORS
DESCRIPTORS: ACHING

## 2022-07-17 ASSESSMENT — PAIN - FUNCTIONAL ASSESSMENT: PAIN_FUNCTIONAL_ASSESSMENT: 0-10

## 2022-07-17 NOTE — ED PROVIDER NOTES
EMERGENCY DEPARTMENT ENCOUNTER    Pt Name: Shy Be  MRN: [id number]  [place] 1995  Date of evaluation: 7/17/22  CHIEF COMPLAINT       Chief Complaint   Patient presents with    Fever    Headache     HISTORY OF PRESENT ILLNESS   68-year-old presents with fever urinary frequency malodorous urine and headache    States just started overnight    Having increasing urinary frequency and has a strong odor    Gradual onset headache. Aching. Throbbing. No neck pain or stiffness. No numbness ting weakness    Denies any chest pain cough shortness of breath. No abdominal pain vomiting diarrhea    No medication at home          REVIEW OF SYSTEMS     Review of Systems   Constitutional:  Positive for fever. Negative for chills. HENT:  Negative for rhinorrhea and sore throat. Eyes:  Negative for discharge and redness. Respiratory:  Negative for shortness of breath. Cardiovascular:  Negative for chest pain. Gastrointestinal:  Negative for abdominal pain, diarrhea, nausea and vomiting. Genitourinary:  Positive for frequency. Negative for dysuria and urgency. Musculoskeletal:  Negative for arthralgias and myalgias. Skin:  Negative for rash. Neurological:  Positive for headaches. Negative for weakness and numbness. Psychiatric/Behavioral:  Negative for suicidal ideas. All other systems reviewed and are negative. PASTMEDICAL HISTORY     Past Medical History:   Diagnosis Date    Anxiety     Depression     Headache(784.0)     constant headaches    Morbid obesity (HCC)     PCOS (polycystic ovarian syndrome)     PTSD (post-traumatic stress disorder)     Vision abnormalities     wears glasses     Past Problem List  Patient Active Problem List   Diagnosis Code    Cervical radicular pain M54.12    Gastritis K29.70     SURGICAL HISTORY     History reviewed. No pertinent surgical history.   CURRENT MEDICATIONS       Previous Medications    ARIPIPRAZOLE (ABILIFY) 2 MG TABLET        HYDROXYZINE (ATARAX) 25 MG TABLET        METRONIDAZOLE (FLAGYL) 500 MG TABLET    Take 1 tablet by mouth 2 times daily     ALLERGIES     is allergic to azithromycin, mycinettes, penicillins, and naproxen. FAMILY HISTORY     She indicated that her mother is alive. She indicated that her father is alive. She indicated that the status of her maternal grandmother is unknown. SOCIAL HISTORY       Social History     Tobacco Use    Smoking status: Former     Packs/day: 0.50     Years: 7.00     Pack years: 3.50     Types: Cigarettes     Quit date: 2022     Years since quittin.2    Smokeless tobacco: Never   Vaping Use    Vaping Use: Every day   Substance Use Topics    Alcohol use: No     Alcohol/week: 0.0 standard drinks    Drug use: No     PHYSICAL EXAM     INITIAL VITALS: /66   Pulse (!) 115   Temp 100 °F (37.8 °C) (Oral)   Resp 16   Ht 5' 6\" (1.676 m)   Wt 282 lb (127.9 kg)   SpO2 94%   BMI 45.52 kg/m²    Physical Exam  Vitals and nursing note reviewed. Constitutional:       Appearance: Normal appearance. HENT:      Head: Normocephalic and atraumatic. Nose: Nose normal.      Mouth/Throat:      Mouth: Mucous membranes are moist.   Eyes:      Conjunctiva/sclera: Conjunctivae normal.      Pupils: Pupils are equal, round, and reactive to light. Cardiovascular:      Rate and Rhythm: Normal rate and regular rhythm. Pulses: Normal pulses. Heart sounds: Normal heart sounds. Pulmonary:      Effort: Pulmonary effort is normal.      Breath sounds: Normal breath sounds. Abdominal:      Palpations: Abdomen is soft. Tenderness: There is no abdominal tenderness. Musculoskeletal:         General: No swelling or deformity. Cervical back: Normal range of motion. Skin:     General: Skin is warm. Findings: No rash. Neurological:      General: No focal deficit present. Mental Status: She is alert and oriented to person, place, and time.       Comments: Cranial nerves II through XII intact, 5 out of 5 strength in upper and lower extremities, sensation intact throughout, normal finger-nose   Psychiatric:         Mood and Affect: Mood normal.       MEDICAL DECISION MAKIN-year-old presents with fever dysuria headache    Differential sepsis, UTI, pyelonephritis, meningitis    No meningeal signs cranial nerves II through XII intact, 5 out of 5 strength in upper and lower extremities, sensation intact throughout, normal finger-nose meningitis      ED Course as of 22 0621   Sun 2022   0436 CBC with Auto Differential:    WBC 5.4   RBC 4.44   Hemoglobin Quant 13.7   Hematocrit 40.2   MCV 90.5   MCH 30.8   MCHC 34.0   RDW 13.0   Platelet Count 141   MPV 7.9   Seg Neutrophils PENDING   Lymphocytes PENDING   Monocytes PENDING   Eosinophils % PENDING   Basophils PENDING   Segs Absolute PENDING   Absolute Lymph # PENDING   Absolute Mono # PENDING   Absolute Eos # PENDING   Basophils Absolute PENDING  Normal [ANGEL]   0441 Lactic Acid:    Lactic Acid 1.5  Normal doubt severe sepsis or septic shock [ANGEL]   0450 CMPReatha Dawley ):    GLUCOSE, FASTING,(!)   BUN,BUNPL 8   Creatinine 0.70   CALCIUM, SERUM, 066575 9.1   Sodium 137   Potassium 4.4   Chloride 103   CO2 22   Anion Gap 12   Alk Phos 80   ALT 34(!)   AST 29   Bilirubin 0.31   Total Protein 6.8   Albumin 4.4   GFR Non- >60   GFR  >60   GFR Comment       Normal [ANGEL]   0451 Lipase:    Lipase 26  Normal [ANGEL]   0522 COVID-19 & Influenza Combo(!):    Specimen Description . NASOPHARYNGEAL SWAB   SOURCE, [id number] . NASOPHARYNGEAL SWAB   SARS-CoV-2 RNA, RT PCR DETECTED(!)   INFLUENZA A Not Detected   INFLUENZA B Not Detected  Covid positive [ANGEL]   0548 XR CHEST PORTABLE  No acute process [ANGEL]   0548 Microscopic Urinalysis(!):    WBC, UA 6 TO 9   RBC, UA 0 TO 2   Casts UA 0 TO 2   Epithelial Cells, UA 6 TO 9   Bacteria, UA FEW(!)  6-9 wbc with few bacteria will cover with abx [ANGEL]   0616 Reassessed. Resting comfortably.  Antony Gathers UTI    Strict return precautions given. Counseled on monitoring her oxygen saturation at home    Vitals:    Vitals:    07/17/22 0351 07/17/22 0514 07/17/22 0616   BP: 122/64 (!) 107/55 126/66   Pulse: (!) 141 (!) 128 (!) 115   Resp: 18 18 16   Temp: (!) 101 °F (38.3 °C) (!) 103.1 °F (39.5 °C) 100 °F (37.8 °C)   TempSrc: Oral Oral Oral   SpO2: 96% 94% 94%   Weight: 282 lb (127.9 kg)     Height: 5' 6\" (1.676 m)         The patient was given the following medications while in the emergency department:  Orders Placed This Encounter   Medications    0.9 % sodium chloride bolus    ketorolac (TORADOL) injection 15 mg    metoclopramide (REGLAN) injection 10 mg    diphenhydrAMINE (BENADRYL) injection 25 mg    acetaminophen (TYLENOL) tablet 1,000 mg    0.9 % sodium chloride bolus    ibuprofen (ADVIL;MOTRIN) tablet 600 mg    nitrofurantoin (macrocrystal-monohydrate) (MACROBID) capsule 100 mg     Order Specific Question:   Antimicrobial Indications     Answer:   Urinary Tract Infection    nitrofurantoin, macrocrystal-monohydrate, (MACROBID) 100 MG capsule     Sig: Take 1 capsule by mouth in the morning and 1 capsule before bedtime. Do all this for 5 days. Dispense:  10 capsule     Refill:  0     CONSULTS:  None    FINAL IMPRESSION      1. COVID-19    2. Acute cystitis without hematuria          DISPOSITION/PLAN   DISPOSITION Decision To Discharge 07/17/2022 06:17:18 AM      PATIENT REFERRED TO:  [facility] [address]  [address] [address]  Schedule an appointment as soon as possible for a visit       [facility] ED  [place] [id number] [phone]    As needed  DISCHARGE MEDICATIONS:  New Prescriptions    NITROFURANTOIN, MACROCRYSTAL-MONOHYDRATE, (MACROBID) 100 MG CAPSULE    Take 1 capsule by mouth in the morning and 1 capsule before bedtime. Do all this for 5 days.      The care is provided during an unprecedented national emergency due to the novel coronavirus, COVID [address], MD  07/17/22 7234